# Patient Record
Sex: FEMALE | Race: WHITE
[De-identification: names, ages, dates, MRNs, and addresses within clinical notes are randomized per-mention and may not be internally consistent; named-entity substitution may affect disease eponyms.]

---

## 2019-06-10 ENCOUNTER — HOSPITAL ENCOUNTER (INPATIENT)
Dept: HOSPITAL 46 - ED | Age: 84
LOS: 3 days | Discharge: HOME | DRG: 536 | End: 2019-06-13
Attending: SPECIALIST | Admitting: SPECIALIST
Payer: MEDICARE

## 2019-06-10 VITALS — HEIGHT: 64 IN | WEIGHT: 133 LBS | BODY MASS INDEX: 22.71 KG/M2

## 2019-06-10 DIAGNOSIS — F03.90: ICD-10-CM

## 2019-06-10 DIAGNOSIS — Z66: ICD-10-CM

## 2019-06-10 DIAGNOSIS — B96.20: ICD-10-CM

## 2019-06-10 DIAGNOSIS — N39.0: ICD-10-CM

## 2019-06-10 DIAGNOSIS — Z88.8: ICD-10-CM

## 2019-06-10 DIAGNOSIS — E03.9: ICD-10-CM

## 2019-06-10 DIAGNOSIS — Z79.899: ICD-10-CM

## 2019-06-10 DIAGNOSIS — S72.012A: Primary | ICD-10-CM
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
## 2019-06-10 DIAGNOSIS — I10: ICD-10-CM

## 2019-06-10 DIAGNOSIS — I25.10: ICD-10-CM

## 2019-06-10 NOTE — NUR
TO  PER STRETCHER FROM ED. MOVED FRON STRETCHER TO BED AND PT LILIAM WELL,
NO CRIES OF PAIN AND IS CALM.  L HIP NOTED TO BE ROTATED, FEET ARE COOL BILAT
BUT HAS POST TIBIAL PULSES BILAT AND FAINT DORASL PEDLALIS PULSES BILAT.
DAUGHTER WITH PT.

## 2019-06-10 NOTE — NUR
DR VALVERDE CALLLED RE 4 PLUS BACTERIA IN UA.  WILL AWAIT CULTURE. SPOKE QITH
DAUGHTER ABOUT PROS AND CONS OF SURGERY. SHE BECAME TEARFUL AT ONE POINT AND
GIVEN SUPPORT. PT IS RESTING.

## 2019-06-11 NOTE — NUR
RECEIVED REPORT FROM DAY SHIFT RN. PATIENT IS RESTING IN BED. DAUGHTER IS AT
THE BEDSIDE. PATIENTS DAUGHTER DENIES ANY NEEDS AT THIS TIME. CALL LIGHT IN
REACH. BED ALARM ON FOR SAFETY.

## 2019-06-11 NOTE — NUR
PT RESTLESS AND GRIMACING. WHEN SHOULDERS MOVED SO HEAD WAS NOT RESTING ON
SIDE RAIL PT STARTLED AND LOOKED FRIGHTENED. HR 90'S AND /66. PT GIVEN
0.5MG DILAUDID IV FOR PAIN. HEEL PROTECTORS ON. FEET ARE WARM AND PINK AND
DORSAL PEDALIS PULSES ARE NOW STRONG.

## 2019-06-11 NOTE — NUR
PATIENTS DAUGHTER NOTIFIED STAFF THAT THE PATIENT WAS UNCOMFORTABLE. PATIENT
REPOSITIONED IN BED. PATIENT NOW APPEARS COMFORTABLE. PATIENTS EYES ARE
CLOSED, RR 17. PATIENTS GUNTER EMPTIED INTAKE AND OUPUT RECORDED. PATIENTS
DAUGHTER DENIES ANY NEEDS. CALL LIGHT IN REACH. BED ALARM IS ON FOR SAFETY.

## 2019-06-11 NOTE — NUR
NOCO GIVEN FOR PAIN. CRUSHED MEDS, NORCO GIVEN WITH APPLESAUSE. DR. VALVERDE HERE
TO SEE PATIENT. ORDERS RECIEVED. DR. VALVERDE TALKED WITH DAUGHTER AND SON ABOUT
PLAN OF CARE.

## 2019-06-11 NOTE — EKG
[facility]
                                    [address]
                                  [place], [place]  [address]
_________________________________________________________________________________________
                                                                 Signed   
 
 
Normal sinus rhythm
Normal ECG
No previous ECGs available
Confirmed by JODY NICOLE MD ([id number]) on 6/11/2019 7:41:19 AM
 
 
 
 
 
 
 
 
 
 
 
 
 
 
 
 
 
 
 
 
 
 
 
 
 
 
 
 
 
 
 
 
 
 
 
 
 
 
 
 
 
    Electronically Signed By: JODY NICOLE MD  06/11/19 0741
_________________________________________________________________________________________
PATIENT NAME:     HUMZANAYYVAN L                       
MEDICAL RECORD #: [id number]                     Electrocardiogram             
          ACCT #: [id number]  
DATE OF BIRTH:   [birth year]                                       
PHYSICIAN:   JODY NICOLE MD                   REPORT #: [id number]
REPORT IS CONFIDENTIAL AND NOT TO BE RELEASED WITHOUT AUTHORIZATION

## 2019-06-11 NOTE — NUR
PT ARRIVED FROM CCU VIA BED. PT ONLY MUMBLES. RESTING IN BED OPENING AND
CLOSING EYES OCC, NO FACIAL GRIMACE OR OTHER SIGNS OF PAIN NOTED AT THIS TIME.
GUNTER PATENT. IV INFUSING WNL. VS OBTAINED. REPORT GIVEN TO TEZ SANDOVAL
NURSE. BED ALARM ON. DAUGHTER RAYNA AT BEDSIDE.

## 2019-06-11 NOTE — NUR
PATIENT ASSEMENT COMPLETED. IV IUNFUSING PER ORDER. PATIENT REPOSTIIONED.
EVENING MEDICATIONS GIVEN PER ORDER. PATIENT APPEARS TO BE RESTING
COMFORTABLY. PATIENTS DAUGHTER DENIES PATIENT BEING IN PAIN. PATIENTS VITALS
TAKEN AND RECORDED. SIPS OF WATER OFFERED. PATIENT COMPLAINT WITH CARE BUT
FEARFUL AT TIMES. PATIENT REASSURED WITH POSTIVIE REINFORCMENT DURING CARE. NO
FURTHER NEEDS NOTED. CALL LIGHT IN REACH. BED ALARM ON FOR SAFETY. DAUGHTER
REMAINS IN THE ROOM. HEEL PROTECTORS IN PLACE.

## 2019-06-12 NOTE — NUR
PT ON ROOM AIR, LUNG SOUNDS CLEAR. HX OF DEMENTIA, AT BASELINE. PT TOLERATING
REGULAR DIET, SWALLOW EVAL SHOWED NO ASPIRATION, REQUIRES ASSISTANCE TO EAT.
PT UNABLE TO COMMUNICATE PAIN, GIVEN SCHEDULED TORADOL, NORCO X1. PT WORKED
WITH P.T., ONLY ABLE TO DO BED EXERCISED, VIVIEN LIFT. IV FLUIDS DISCONTINUED,
IV CEFEPIME. GUNTER CATH IN PLACE, QS. UNABLE TO ASSESS SENSATION, HEEL
PROTECTORS IN PLACE.

## 2019-06-12 NOTE — NUR
BEDSIDE HANDOFF REPORT RECEIVED FROM NIGHT SHIFT RN. PT SLEEPING, LEFT
UNDISTURBED. IV FLUIDS IN FUSING D5NS +20MEQ K AT 75, CEFEPIME INFUSING. BED
ALARM IN PLACE.

## 2019-06-12 NOTE — NUR
PATIENT REPOSITIONED IN BED. PATIENT APPEARS RESTLESS AND IS CRYING OUT.
PATIENT GIVEN PRN PAIN MEDICAITON PER ORDER. PATIENTS GUNTER EMPTIED. PATIENT
PROVIDED WITH DRINKS OF WATER. PATIENT WAS ABLE TO DRINK A FAIR AMOUNT OF
WATER. PATIENT GIVEN APPLESAUCE WITH MEDICINE. PATIENT WAS ABLE TO EAT 1/2 CUP
APPLESAUCE WITH ASSISTANCE. NO FURTHER NEEDS NOTED. CALL LIGHT IN REACH. BED
ALARM ON FOR SAFETY.

## 2019-06-12 NOTE — NUR
PATIENT REPOSITIONED IN BED. PATIENTS VITALS TAKEN AND RECORDED. GUNTER EMPTIED
AND GUNTER CARE COMPLETED. PATIENT WAS ANXIOUS WITH CARE. PATIENT NOW APPEARS
TO BE RESTING IN BED PEACEFULLY. PATIENT HAS BILAT HEEL PROTECTORS IN.
PATIENTS BED ALARM IS ON FOR SAFETY. CALL LIGHT IN REACH.

## 2019-06-12 NOTE — NUR
PT RESTING IN BED, DAUGHTER AT BEDSIDE. PT APPEARS COMFORTABLE, DAUGHTER DOES
NOT THIS PT IS HAVING PAIN. PT ON ROOM AIR, LUNG SOUND CLEAR. BOWEL TONES
ACTIVE. PT MOVING RIGHT LEG WITHOUT DIFFICULTY, PT NOT FOLLOWING COMMANDS DUE
TO DEMENTIA, LEG REST PULSE PALPBLE, PAIN WITH MOVEMENT, HEEL PROTECTORS IN
PLACE. IV FLUIDS INFUSING D5NS +20 MEQ K AT 75ML/HR, IV CEFEPIME INFUSING.
DISCUSSED PLAN OF CARE WITH DAUGHTER. DAUGHTER DENIES NEEDS AT THIS TIME.

## 2019-06-12 NOTE — NUR
NURSE AIDE TO ASSIST PT TO EAT LUNCH, NURSE AIDE STATES PT REPORTING PAIN.
FLACC SCORE 6, GIVEN 1 TAB NORCO, CRUSHED AND GIVEN WITH APPLE SAUCE.

## 2019-06-12 NOTE — NUR
SPOKE WITH BRANDY FROM [facility].  SHE STATES THEY ARE PLANNING ON PATIENT
RETURNING TO FACILITY.  SHE STATES PATIENT WAS DECLINING RECENTLY DUE TO
DEMENTIA.  SHE STATES SHE DOES THINK SHE WILL NEED A HOSPITAL BED, PROBABLE
VIVIEN LIFT, CRUSHABLE MEDICATIONS, AND GUNTER FOR COMFORT.  DISCUSSED THAT I
WILL RUN THAT BY HER DISCHARGING PHYSCIAN.  SHE STATES POSSIBLE HOME HEALTH OR
HOSPICE COULD BE USED, ALSO.  WILL CONTINUE TO WORK ON DISCHARGE PLAN WITH
STAFF, FAMILY.

## 2019-06-12 NOTE — NUR
DR VALVERDE CALLED TO CLARIFY IF PT SHOUL DHAVE P.T. ORDER, TELEPHONE ORDER FOR
P.T. EVAL AND TREAT, TOE TOUCH WEIGHT BEARING. ORDER TO DC IV FLUIDS SINCE PT
TAKING FLUIDS WELL AND HAD GOOD URINE OUTPUT, RBOV.

## 2019-06-12 NOTE — NUR
COOP WITH ASSESSMENT, SLIGHTLY AGITATED, DAUGHTER IN ROOM WAS CONCERNED ABOUT
MOTHERS CONSTIPATION. DR ISABEL CALLED, NEW ORDERS FOR SENNA OBTAINED. CALL
LIGHT AT BEDSIDE, TOLERATED LIQUIDS FINE, REPOSITIONED IN BED, F/C IN PLACE

## 2019-06-12 NOTE — NUR
PT RESTINGIN BED, PT APPEARS COMFORTABLE. IV TO RIGHT AC LEAKING,
DISCONTINUED. NEW IV PLACED TO LEFT FOREARM, IV TORADOL GIVEN. IV FLUIDS AND
CEFEPIME STARTED. PT WITH GUNTER DRAINING YELLOW URINE. NO NO ACUTE CHANGES.
BED ALARM IN PLACE.

## 2019-06-12 NOTE — NUR
PATIENT RESPOSTIONED IN BED. PATIENT TOLERATED ACTIVITY WELL. PATIENT IS
RESTING IN BED. DRINK OF WATER PROVIDED. CALL LIGHT IN REACH. BED ALARM ON FOR
SAFETY.

## 2019-06-12 NOTE — NUR
PATIENT RESTED WELL THROUGHOUT THE SHIFT. PATIENT IS ON A REGULAR DIET AND
REQUIRES ASSISTANCE WITH FEEDING. PATIENT HAS A GUNTER IN PLACE THAT IS NOT
CHRONIC. PATIENTS OUPUT IS QS. PATIENT IS WEIGHT BEARING AS TOLERATED, AND HAS
NOT BEEN OUT OF BED. PATIENT HAS HEEL PROTECTORS IN PLACE ON BILAT FEET.
PATIENT TAKES HER PILLS CRUSHED IN APPLESAUCE. PATIENT SPEAKS IN FRAGMENTED
SENTENCES. PATIENT IS NOT ALERT OR ORIENTED. PATIENT DOES NOT USE CALL LIGHT.
PATIENT HAS BED ALARM ON FOR SAFETY.

## 2019-06-12 NOTE — NUR
ROUNDED AS CHARGE. PATIENT IS RESTING IN BED. DAUGHTER IS AT THE BEDSIDE.
ALL QUESTIONS ANSWERED. PATIENT APPEARS TO BE RESTING COMFORTABLY. CALL LIGHT
IN REACH. BED ALARM IS ON FOR SAFETY.

## 2019-06-13 NOTE — NUR
TURNED, COOPERATIVE, MOUTH CARE DONE. F/C PATENT, DRAINING DARK YELLOW URINE.
HEEL PROTECTORS IN PLACE. PROCEDURE EXPLAINED. BED ALARM ON, RAILS UP, CALL
LIHGT AT BEDSIDE.

## 2019-06-13 NOTE — NUR
PT SITTING IN CHAIR. PT HAS REMAINED UPRIGHT. DISCUSSED TRANSPORTATION WITH
BANDAR, AGREED THAT PT APPEARS TO COMFORTABLE AND SAFE SITTING AND WOULD BE
TRANSPORTED BY WHEEL CHAIR VAN. PT WILL STAY AND EAT LUNCH IN CHAIR, DISCUSSED
VIVIEN BACK TO BED TO GET DRESSED. DAUGHTER DENEIS OTHER NEEDS AT THIS TIME.

## 2019-06-13 NOTE — NUR
BEDSIDE HANDOFF REPORT RECEIVED FROM NIGHT SHIFT RN. PT RESTING COMFORTABLY IN
BED. BED ALARM ON. IV CEFEPIME INFUSING.

## 2019-06-13 NOTE — NUR
PT WITH GUNTER CATH, MICHELE FROM [facility] ASKING IF PT WILL BE DISCHARGED
WIHT CATHETER, WOULD NEED HOME HEALTH FOR GUNTER CARE ORDERED. DR. VALVERDE
CALLED, ORDER TO DISCONTINUE CATHERTER.

## 2019-06-13 NOTE — NUR
SPOKE WITH DR VALVERDE AT [facility].  HE IS DISCHARGING PATIENT AND SPOKE
WITH DAUGHTER.  DISCUSSED FACILITY ASK FOR HOSPITAL BED.  HE STATES PATIENT
DOES NOT QUALIFY, SHE CAN BE OOB TO CHAIR AS TOLERATED AND SHE IS NOT
ASPIRATING.  HE ALSO STATES NO PHYSICAL THERAPY AT THIS TIME.  HE WILL FOLLOW
PATIENT AS OUTPATIENT GOING FORWARD FOR FURTHER NEEDS.

## 2019-06-13 NOTE — NUR
PT WAS RESTLESS AT BEGINING OF SHIFT, GETS MEDICATED WITH SCHEDULED TORADOL.
EFFECTIVE. SL PATENT, NO ADVERSE REACTION TO ABX. PT TURNED Q2H, PAINFUL L HIP
WITH TURNING, ALL PROCEDURES EXPLAINED TO PT, PT HAS DEMENTIA, UNABLE TO
ASSESS DEGREE OF UNDERSTANDING. F/C PATENT. BED ALARM ON, CALL LIHGT AT
BEDSIDE., KICLS HEEL PROTECTORS OFF, REPOSITIONING T/O THIS NIGHT. ON ROOM AIR

## 2019-06-13 NOTE — NUR
MESSAGE LEFT FOR BRANDY AT [facility] THAT PATIENT IS BEING DISCHARGED TO
FACILITY TODAY AS EXPECTED.  LEFT MESSAGE FOR HER TO CALL [facility] AT
[phone] FOR QUESTIONS.

## 2019-06-13 NOTE — NUR
REPOSITIONED IN BED, PROCEDURE EXPLAINED, SEMI COOPERATIVE, EASILY
REDIRECTABLE. GPPD CMS L LEG, HEEL PROTECTORS PLACED BACK ON AS SHE KICKS THEM
OFF. ABD INFUSING, F/C PATENT, DRAINING LARGE AMOUNTS OF CLEAR URINE. CALL
LIGHT AT BEDSIDE, BED ALARM ON.

## 2019-06-13 NOTE — NUR
SPOKE WITH DAUGHTER DIVINA IN ROOM.  PATIENT AWAKE, DOES NOT RESPOND TO
QUESTIONS DUE TO DEMENTIA.  DAUGHTER HAS SEEN DR VALVERDE AND IS AWARE OF
DISCHARGE BACK TO [facility] TODAY.  DISCUSSED THAT I SPOKE WITH BRANDY FROM
[facility] YESTERDAY AND THAT THEY WERE HOPING TO GET A HOSPITAL BED AT SOME
POINT FOR PATIENT, BUT THAT AT THIS TIME DR VALVERDE DOES NOT THINK SHE QUALIFIES
FOR ONE.  WE DISCUSSED THAT THEY CAN TALK WITH HER PCP ABOUT THIS, TOO.  DIVINA
WOULD LIKE FOR US TO USE WHEELCHAIR TAXI FOR PATIENTS RETURN TO FACILITY.  WE
DISCUSSED EMS NON-EMERGENT AND THAT IF WHEELCHAIR TAX ISN'T FEESIBLE THAN THEY
CAN TRY EMS, BUT THAT I CANNOT GUARANTEE HER INSURANCE WOULD PAY FOR THIS.
SHE STATES THEY USE THE WHEELCHAIR TAXI FOR PATIENT ROUTINELY AND WANTS TO TRY
THIS TODAY.  DIVINA IS WELL UNDERSTANDING OF WHAT IS EXPECTED GOING FORWARD
AFTER VISITING WITH DR VALVERDE AND DR NICOLE.  SHE STATES THE STAFF HAS DONE A
GOOD JOB KEEPING HER UPDATED, ALSO.  QUESTIONS ANSWERED.  NO CONCERNS FROM
DIVINA REGARDING PATIENT RETURNING TO [facility] TODAY.
 
STAFF NURSES UPDATED ON PLAN, THEY WILL ARRANGE DISCHARGE AND TRANSPORTATION.

## 2019-06-13 NOTE — NUR
PT RESTIGN QUIETLY IN BED, DAUGHTER AT BEDSIDE. DAUGHTER STATES PT DOES NOT
APPEAR TO BED IN PAIN AT THIS TIME. PT ON ROOM AIR, LUNG SOUNDS CLEAR. BOWEL
TONES ACTIVE, TOLERATING REGULAR DIET, ASSISTED WITH ORDERING BREAKFAST. PT
WITHOTU EDEMA, CMS INTACT, PULSES STRONG. GUNTER CATH IN PLACE, DRAINING YELLOW
URINE. IV CEFEPINE INFUSING. MORNING MEDICATIONS GIVEN WITH APPLED SAUCE. PT
DENIES OTHER NEEDS AT THIS TIME.